# Patient Record
Sex: MALE | Race: WHITE | NOT HISPANIC OR LATINO | Employment: OTHER | URBAN - METROPOLITAN AREA
[De-identification: names, ages, dates, MRNs, and addresses within clinical notes are randomized per-mention and may not be internally consistent; named-entity substitution may affect disease eponyms.]

---

## 2018-10-08 ENCOUNTER — HISTORICAL (OUTPATIENT)
Dept: ADMINISTRATIVE | Facility: HOSPITAL | Age: 63
End: 2018-10-08

## 2022-04-10 ENCOUNTER — HISTORICAL (OUTPATIENT)
Dept: ADMINISTRATIVE | Facility: HOSPITAL | Age: 67
End: 2022-04-10
Payer: MEDICARE

## 2022-04-29 VITALS
BODY MASS INDEX: 28.88 KG/M2 | WEIGHT: 225 LBS | SYSTOLIC BLOOD PRESSURE: 122 MMHG | HEIGHT: 74 IN | DIASTOLIC BLOOD PRESSURE: 72 MMHG

## 2023-02-15 RX ORDER — PANTOPRAZOLE SODIUM 40 MG/1
40 TABLET, DELAYED RELEASE ORAL
COMMUNITY
Start: 2022-01-06

## 2023-02-15 RX ORDER — PANTOPRAZOLE SODIUM 40 MG/1
40 TABLET, DELAYED RELEASE ORAL DAILY
COMMUNITY

## 2023-02-15 RX ORDER — LEVETIRACETAM 750 MG/1
750 TABLET ORAL 2 TIMES DAILY
COMMUNITY

## 2023-02-15 RX ORDER — CANAGLIFLOZIN 100 MG/1
100 TABLET, FILM COATED ORAL DAILY
COMMUNITY

## 2023-02-15 RX ORDER — METFORMIN HYDROCHLORIDE 500 MG/1
500 TABLET ORAL 2 TIMES DAILY WITH MEALS
COMMUNITY

## 2023-02-15 RX ORDER — BUSPIRONE HYDROCHLORIDE 10 MG/1
10 TABLET ORAL
COMMUNITY
Start: 2022-01-06

## 2023-02-15 RX ORDER — CYCLOSPORINE 0.5 MG/ML
1 EMULSION OPHTHALMIC
COMMUNITY
Start: 2022-01-06

## 2023-07-20 ENCOUNTER — OFFICE VISIT (OUTPATIENT)
Dept: NEUROLOGY | Facility: CLINIC | Age: 68
End: 2023-07-20
Payer: MEDICARE

## 2023-07-20 VITALS
WEIGHT: 224 LBS | BODY MASS INDEX: 28.75 KG/M2 | DIASTOLIC BLOOD PRESSURE: 84 MMHG | SYSTOLIC BLOOD PRESSURE: 120 MMHG | HEIGHT: 74 IN

## 2023-07-20 DIAGNOSIS — Q28.2 CEREBRAL AVM: Primary | ICD-10-CM

## 2023-07-20 DIAGNOSIS — G81.94 LEFT HEMIPLEGIA: ICD-10-CM

## 2023-07-20 DIAGNOSIS — Z86.79 HISTORY OF CEREBRAL HEMORRHAGE: ICD-10-CM

## 2023-07-20 PROCEDURE — 99999 PR PBB SHADOW E&M-EST. PATIENT-LVL III: CPT | Mod: PBBFAC,,, | Performed by: SPECIALIST

## 2023-07-20 PROCEDURE — 99213 PR OFFICE/OUTPT VISIT, EST, LEVL III, 20-29 MIN: ICD-10-PCS | Mod: S$PBB,,, | Performed by: SPECIALIST

## 2023-07-20 PROCEDURE — 99213 OFFICE O/P EST LOW 20 MIN: CPT | Mod: PBBFAC | Performed by: SPECIALIST

## 2023-07-20 PROCEDURE — 99999 PR PBB SHADOW E&M-EST. PATIENT-LVL III: ICD-10-PCS | Mod: PBBFAC,,, | Performed by: SPECIALIST

## 2023-07-20 PROCEDURE — 99213 OFFICE O/P EST LOW 20 MIN: CPT | Mod: S$PBB,,, | Performed by: SPECIALIST

## 2023-07-20 RX ORDER — ROSUVASTATIN CALCIUM 20 MG/1
20 TABLET, COATED ORAL
COMMUNITY

## 2023-07-20 RX ORDER — ASPIRIN 81 MG/1
TABLET ORAL
COMMUNITY

## 2023-07-20 RX ORDER — DOXEPIN HYDROCHLORIDE 50 MG/1
50 CAPSULE ORAL NIGHTLY
COMMUNITY
Start: 2023-02-06

## 2023-07-20 RX ORDER — AMOXICILLIN 500 MG
2 CAPSULE ORAL
COMMUNITY

## 2023-07-20 NOTE — PROGRESS NOTES
"Subjective:         Patient ID: Isac Guzman is a 68 y.o. male.    Chief Complaint: F/U AVM.     HPI:           Pt has left sided paralysis. Pt c/o decreased hearing in left ear. Denies H., Sz, diff w Speech or vision problems. Pt ambulates w a 4 prong cane.       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    busPIRone (BUSPAR) 10 mg, Oral    cycloSPORINE (RESTASIS MULTIDOSE) 0.05 % Drop 1 drop    doxepin (SINEQUAN) 50 mg, Oral, Nightly    INVOKANA 100 mg, Oral, Daily    levETIRAcetam (KEPPRA) 750 mg, Oral, 2 times daily    metFORMIN (GLUCOPHAGE) 500 mg, Oral, 2 times daily with meals    omega-3 fatty acids/fish oil (FISH OIL-OMEGA-3 FATTY ACIDS) 300-1,000 mg capsule 2 g, Oral    pantoprazole (PROTONIX) 40 mg, Oral    pantoprazole (PROTONIX) 40 mg, Oral, Daily    polycarbophil (FIBERCON) 625 mg, Oral    rosuvastatin (CRESTOR) 20 mg, Oral        Objective:      Exam  /84   Ht 6' 2" (1.88 m)   Wt 101.6 kg (224 lb)   BMI 28.76 kg/m²     General:   [x] Unaccompanied   [] Accompanied, by__  heart:   pharynx:    Neurological []nl  []Abnml:     Speech:  [x] []  vis fields:  [] []  EOMs:  [] []  funduscopic: [] []  Motor:   [] [x]L hemiparesis   coord:   [] []  Gait:   [] [x]Cane     Neuroimaging:  []Images and imaging reports reviewed.  My comments: last mri ace 2021     Labs:            Assessment/Plan:         ICD-10-CM ICD-9-CM   1. Cerebral AVM  Q28.2 747.81   2. History of cerebral hemorrhage  Z86.79 V12.59   3. " Left hemiplegia  G81.94 342.90         Other comments/ follow up:        Cont pres mgmt     Follow up in about 1 year (around 7/20/2024).         Constantine Marina MD TEOFILO FAAN FAASM

## 2024-04-22 ENCOUNTER — ANESTHESIA EVENT (OUTPATIENT)
Dept: SURGERY | Facility: HOSPITAL | Age: 69
DRG: 671 | End: 2024-04-22
Payer: MEDICARE

## 2024-04-24 RX ORDER — POLYETHYLENE GLYCOL 3350 17 G/17G
17 POWDER, FOR SOLUTION ORAL DAILY
COMMUNITY

## 2024-04-30 ENCOUNTER — HOSPITAL ENCOUNTER (INPATIENT)
Facility: HOSPITAL | Age: 69
LOS: 2 days | Discharge: HOME HEALTH | DRG: 671 | End: 2024-05-02
Attending: UROLOGY | Admitting: UROLOGY
Payer: MEDICARE

## 2024-04-30 ENCOUNTER — ANESTHESIA (OUTPATIENT)
Dept: SURGERY | Facility: HOSPITAL | Age: 69
DRG: 671 | End: 2024-04-30
Payer: MEDICARE

## 2024-04-30 DIAGNOSIS — Z86.79 HISTORY OF CEREBRAL HEMORRHAGE: ICD-10-CM

## 2024-04-30 DIAGNOSIS — Q28.2 CEREBRAL AVM: ICD-10-CM

## 2024-04-30 DIAGNOSIS — N39.3 MALE URINARY STRESS INCONTINENCE: Primary | ICD-10-CM

## 2024-04-30 LAB
POCT GLUCOSE: 109 MG/DL (ref 70–110)
POCT GLUCOSE: 79 MG/DL (ref 70–110)
POCT GLUCOSE: 92 MG/DL (ref 70–110)

## 2024-04-30 PROCEDURE — D9220A PRA ANESTHESIA: Mod: ANES,,, | Performed by: ANESTHESIOLOGY

## 2024-04-30 PROCEDURE — 25000003 PHARM REV CODE 250: Performed by: UROLOGY

## 2024-04-30 PROCEDURE — 36000706: Performed by: UROLOGY

## 2024-04-30 PROCEDURE — 71000039 HC RECOVERY, EACH ADD'L HOUR: Performed by: UROLOGY

## 2024-04-30 PROCEDURE — 37000008 HC ANESTHESIA 1ST 15 MINUTES: Performed by: UROLOGY

## 2024-04-30 PROCEDURE — 63600175 PHARM REV CODE 636 W HCPCS: Performed by: UROLOGY

## 2024-04-30 PROCEDURE — 25000003 PHARM REV CODE 250: Performed by: NURSE ANESTHETIST, CERTIFIED REGISTERED

## 2024-04-30 PROCEDURE — 0TVD8ZZ RESTRICTION OF URETHRA, VIA NATURAL OR ARTIFICIAL OPENING ENDOSCOPIC: ICD-10-PCS | Performed by: UROLOGY

## 2024-04-30 PROCEDURE — 37000009 HC ANESTHESIA EA ADD 15 MINS: Performed by: UROLOGY

## 2024-04-30 PROCEDURE — D9220A PRA ANESTHESIA: Mod: CRNA,,, | Performed by: NURSE ANESTHETIST, CERTIFIED REGISTERED

## 2024-04-30 PROCEDURE — 25000003 PHARM REV CODE 250: Performed by: ANESTHESIOLOGY

## 2024-04-30 PROCEDURE — 71000033 HC RECOVERY, INTIAL HOUR: Performed by: UROLOGY

## 2024-04-30 PROCEDURE — 11000001 HC ACUTE MED/SURG PRIVATE ROOM

## 2024-04-30 PROCEDURE — C1758 CATHETER, URETERAL: HCPCS | Performed by: UROLOGY

## 2024-04-30 PROCEDURE — 71000015 HC POSTOP RECOV 1ST HR: Performed by: UROLOGY

## 2024-04-30 PROCEDURE — 63600175 PHARM REV CODE 636 W HCPCS: Performed by: NURSE ANESTHETIST, CERTIFIED REGISTERED

## 2024-04-30 PROCEDURE — L8606 SYNTHETIC IMPLNT URINARY 1ML: HCPCS | Performed by: UROLOGY

## 2024-04-30 PROCEDURE — 82962 GLUCOSE BLOOD TEST: CPT | Performed by: UROLOGY

## 2024-04-30 PROCEDURE — 36000707: Performed by: UROLOGY

## 2024-04-30 DEVICE — SYS BULKAMID URETH BULKING 2ML: Type: IMPLANTABLE DEVICE | Site: URETHRA | Status: FUNCTIONAL

## 2024-04-30 RX ORDER — BUSPIRONE HYDROCHLORIDE 5 MG/1
10 TABLET ORAL 3 TIMES DAILY
Status: DISCONTINUED | OUTPATIENT
Start: 2024-04-30 | End: 2024-05-03 | Stop reason: HOSPADM

## 2024-04-30 RX ORDER — PHENYLEPHRINE HCL IN 0.9% NACL 1 MG/10 ML
SYRINGE (ML) INTRAVENOUS
Status: DISCONTINUED | OUTPATIENT
Start: 2024-04-30 | End: 2024-04-30

## 2024-04-30 RX ORDER — HYDROCODONE BITARTRATE AND ACETAMINOPHEN 5; 325 MG/1; MG/1
1 TABLET ORAL EVERY 4 HOURS PRN
Status: DISCONTINUED | OUTPATIENT
Start: 2024-04-30 | End: 2024-05-03 | Stop reason: HOSPADM

## 2024-04-30 RX ORDER — SODIUM CHLORIDE, SODIUM LACTATE, POTASSIUM CHLORIDE, CALCIUM CHLORIDE 600; 310; 30; 20 MG/100ML; MG/100ML; MG/100ML; MG/100ML
INJECTION, SOLUTION INTRAVENOUS CONTINUOUS
Status: DISCONTINUED | OUTPATIENT
Start: 2024-04-30 | End: 2024-05-03 | Stop reason: HOSPADM

## 2024-04-30 RX ORDER — DEXAMETHASONE SODIUM PHOSPHATE 4 MG/ML
INJECTION, SOLUTION INTRA-ARTICULAR; INTRALESIONAL; INTRAMUSCULAR; INTRAVENOUS; SOFT TISSUE
Status: DISCONTINUED | OUTPATIENT
Start: 2024-04-30 | End: 2024-04-30

## 2024-04-30 RX ORDER — CIPROFLOXACIN 2 MG/ML
400 INJECTION, SOLUTION INTRAVENOUS
Status: DISCONTINUED | OUTPATIENT
Start: 2024-04-30 | End: 2024-04-30 | Stop reason: HOSPADM

## 2024-04-30 RX ORDER — CIPROFLOXACIN 500 MG/1
500 TABLET ORAL 2 TIMES DAILY
Qty: 10 TABLET | Refills: 0 | Status: SHIPPED | OUTPATIENT
Start: 2024-04-30 | End: 2024-05-05

## 2024-04-30 RX ORDER — KETOROLAC TROMETHAMINE 30 MG/ML
INJECTION, SOLUTION INTRAMUSCULAR; INTRAVENOUS
Status: DISCONTINUED | OUTPATIENT
Start: 2024-04-30 | End: 2024-04-30

## 2024-04-30 RX ORDER — GLYCOPYRROLATE 0.2 MG/ML
INJECTION INTRAMUSCULAR; INTRAVENOUS
Status: DISCONTINUED | OUTPATIENT
Start: 2024-04-30 | End: 2024-04-30

## 2024-04-30 RX ORDER — LIDOCAINE HYDROCHLORIDE 10 MG/ML
INJECTION, SOLUTION EPIDURAL; INFILTRATION; INTRACAUDAL; PERINEURAL
Status: DISCONTINUED | OUTPATIENT
Start: 2024-04-30 | End: 2024-04-30

## 2024-04-30 RX ORDER — LEVETIRACETAM 750 MG/1
750 TABLET ORAL 2 TIMES DAILY
Status: DISCONTINUED | OUTPATIENT
Start: 2024-04-30 | End: 2024-04-30

## 2024-04-30 RX ORDER — SODIUM CHLORIDE 0.9 % (FLUSH) 0.9 %
10 SYRINGE (ML) INJECTION
Status: DISCONTINUED | OUTPATIENT
Start: 2024-04-30 | End: 2024-04-30 | Stop reason: HOSPADM

## 2024-04-30 RX ORDER — PROPOFOL 10 MG/ML
INJECTION, EMULSION INTRAVENOUS
Status: DISCONTINUED | OUTPATIENT
Start: 2024-04-30 | End: 2024-04-30

## 2024-04-30 RX ORDER — LEVETIRACETAM 100 MG/ML
750 SOLUTION ORAL 2 TIMES DAILY
Status: DISCONTINUED | OUTPATIENT
Start: 2024-04-30 | End: 2024-05-03 | Stop reason: HOSPADM

## 2024-04-30 RX ORDER — LIDOCAINE HYDROCHLORIDE 10 MG/ML
1 INJECTION, SOLUTION EPIDURAL; INFILTRATION; INTRACAUDAL; PERINEURAL ONCE
Status: CANCELLED | OUTPATIENT
Start: 2024-04-30 | End: 2024-04-30

## 2024-04-30 RX ORDER — FAMOTIDINE 10 MG/ML
20 INJECTION INTRAVENOUS ONCE
Status: COMPLETED | OUTPATIENT
Start: 2024-04-30 | End: 2024-04-30

## 2024-04-30 RX ORDER — ONDANSETRON HYDROCHLORIDE 2 MG/ML
INJECTION, SOLUTION INTRAVENOUS
Status: DISCONTINUED | OUTPATIENT
Start: 2024-04-30 | End: 2024-04-30

## 2024-04-30 RX ORDER — FENTANYL CITRATE 50 UG/ML
INJECTION, SOLUTION INTRAMUSCULAR; INTRAVENOUS
Status: DISCONTINUED | OUTPATIENT
Start: 2024-04-30 | End: 2024-04-30

## 2024-04-30 RX ORDER — HYDROCODONE BITARTRATE AND ACETAMINOPHEN 5; 325 MG/1; MG/1
1 TABLET ORAL
Status: DISCONTINUED | OUTPATIENT
Start: 2024-04-30 | End: 2024-04-30 | Stop reason: HOSPADM

## 2024-04-30 RX ORDER — MIDAZOLAM HYDROCHLORIDE 1 MG/ML
INJECTION INTRAMUSCULAR; INTRAVENOUS
Status: DISCONTINUED | OUTPATIENT
Start: 2024-04-30 | End: 2024-04-30

## 2024-04-30 RX ORDER — HYDROMORPHONE HYDROCHLORIDE 2 MG/ML
0.2 INJECTION, SOLUTION INTRAMUSCULAR; INTRAVENOUS; SUBCUTANEOUS EVERY 5 MIN PRN
Status: DISCONTINUED | OUTPATIENT
Start: 2024-04-30 | End: 2024-04-30 | Stop reason: HOSPADM

## 2024-04-30 RX ORDER — DOXEPIN HYDROCHLORIDE 25 MG/1
50 CAPSULE ORAL NIGHTLY
Status: DISCONTINUED | OUTPATIENT
Start: 2024-04-30 | End: 2024-05-03 | Stop reason: HOSPADM

## 2024-04-30 RX ORDER — ONDANSETRON 4 MG/1
8 TABLET, ORALLY DISINTEGRATING ORAL EVERY 8 HOURS PRN
Status: DISCONTINUED | OUTPATIENT
Start: 2024-04-30 | End: 2024-05-03 | Stop reason: HOSPADM

## 2024-04-30 RX ORDER — FENTANYL CITRATE 50 UG/ML
25 INJECTION, SOLUTION INTRAMUSCULAR; INTRAVENOUS EVERY 5 MIN PRN
Status: DISCONTINUED | OUTPATIENT
Start: 2024-04-30 | End: 2024-04-30 | Stop reason: HOSPADM

## 2024-04-30 RX ADMIN — GLYCOPYRROLATE 0.2 MG: 0.2 INJECTION INTRAMUSCULAR; INTRAVENOUS at 05:04

## 2024-04-30 RX ADMIN — CIPROFLOXACIN 400 MG: 2 INJECTION, SOLUTION INTRAVENOUS at 05:04

## 2024-04-30 RX ADMIN — PROPOFOL 150 MG: 10 INJECTION, EMULSION INTRAVENOUS at 05:04

## 2024-04-30 RX ADMIN — ONDANSETRON 4 MG: 2 INJECTION INTRAMUSCULAR; INTRAVENOUS at 06:04

## 2024-04-30 RX ADMIN — DOXEPIN HYDROCHLORIDE 50 MG: 25 CAPSULE ORAL at 09:04

## 2024-04-30 RX ADMIN — KETOROLAC TROMETHAMINE 15 MG: 30 INJECTION, SOLUTION INTRAMUSCULAR; INTRAVENOUS at 06:04

## 2024-04-30 RX ADMIN — LIDOCAINE HYDROCHLORIDE 4 ML: 10 INJECTION, SOLUTION EPIDURAL; INFILTRATION; INTRACAUDAL; PERINEURAL at 05:04

## 2024-04-30 RX ADMIN — LEVETIRACETAM 750 MG: 100 SOLUTION ORAL at 09:04

## 2024-04-30 RX ADMIN — Medication 100 MCG: at 05:04

## 2024-04-30 RX ADMIN — FAMOTIDINE 20 MG: 10 INJECTION, SOLUTION INTRAVENOUS at 04:04

## 2024-04-30 RX ADMIN — MIDAZOLAM HYDROCHLORIDE 2 MG: 1 INJECTION, SOLUTION INTRAMUSCULAR; INTRAVENOUS at 05:04

## 2024-04-30 RX ADMIN — FENTANYL CITRATE 100 MCG: 50 INJECTION, SOLUTION INTRAMUSCULAR; INTRAVENOUS at 05:04

## 2024-04-30 RX ADMIN — BUSPIRONE HYDROCHLORIDE 10 MG: 5 TABLET ORAL at 09:04

## 2024-04-30 RX ADMIN — SODIUM CHLORIDE, POTASSIUM CHLORIDE, SODIUM LACTATE AND CALCIUM CHLORIDE: 600; 310; 30; 20 INJECTION, SOLUTION INTRAVENOUS at 11:04

## 2024-04-30 RX ADMIN — DEXAMETHASONE SODIUM PHOSPHATE 4 MG: 4 INJECTION, SOLUTION INTRA-ARTICULAR; INTRALESIONAL; INTRAMUSCULAR; INTRAVENOUS; SOFT TISSUE at 05:04

## 2024-04-30 NOTE — ANESTHESIA PROCEDURE NOTES
Intubation    Date/Time: 4/30/2024 5:32 PM    Performed by: Stephen Ledesma CRNA  Authorized by: Thanh Keane DO    Intubation:     Induction:  Intravenous    Mask Ventilation:  Not attempted    Attempts:  1    Attempted By:  CRNA    Difficult Airway Encountered?: No      Complications:  None    Airway Device:  Supraglottic airway/LMA    Airway Device Size:  4.0    Style/Cuff Inflation:  Cuffed (inflated to minimal occlusive pressure)    Inflation Amount (mL):  20    Placement Verified By:  Capnometry    Complicating Factors:  None    Findings Post-Intubation:  BS equal bilateral and atraumatic/condition of teeth unchanged

## 2024-04-30 NOTE — DISCHARGE SUMMARY
[facility] - Periop Services  Discharge Note  Short Stay    Procedure(s) (LRB):  CYSTOSCOPY, WITH PERIURETHRAL BULKING AGENT INJECTION (N/A)      OUTCOME: Patient tolerated treatment/procedure well without complication and is now ready for discharge.    DISPOSITION: Home or Self Care    FINAL DIAGNOSIS:  Male urinary stress incontinence    FOLLOWUP: In clinic    DISCHARGE INSTRUCTIONS:    Discharge Procedure Orders   Diet general     Call MD for:  temperature >100.4     Call MD for:  persistent nausea and vomiting     Call MD for:  severe uncontrolled pain     No dressing needed     Activity as tolerated        TIME SPENT ON DISCHARGE: 15 minutes

## 2024-04-30 NOTE — OP NOTE
operative note    Surgeon:  Seferino Gardner MD     Date of procedure:  04/30/2024     Preop diagnosis:  Post prostatectomy stress urinary incontinence    Postop diagnosis: Same     Procedure:  Suburethral bulking agent, Bulkamid    Preoperative history and indication for procedure:  This patient is a 69-year-old white male with history of prostate cancer and he underwent robot assisted laparoscopic prostatectomy couple of years ago.  He was having some stress incontinence.  He has had a stroke and has left hemiparesis and probably some component of neurogenic bladder as well.  I used some macro plastic previously but he still had persisting incontinence.  He comes in today for a trial with different bulking agent.      Procedure in detail:  After obtaining proper consent the patient was taken to the procedure room with a cystoscopy equipment was available.  We open the urethra tome system to use for the endoscopic portion.  Cystoscopy was carried out revealing intact sphincter with apparent prior injection sites.  The sphincter seemed fairly open.  The bulk of mid was injected in several locations circumferentially around the sphincteric area submucosally with good ballooning and coaptation.  The areas where macro plasty CT been were not very receptive to Aneviaa Unype.  I ended up using 4 syringes and was ultimately very pleased with the amount of coaptation that we got.  After completion of the injection I inserted a 5 Icelandic open-ended catheter through the aperture and aspirated his bladder.  On examination at the end the prostate was palpable as it is surgically absent.  That completed the procedure.  The patient was awakened and extubated and transported to recovery room with stable vital signs and in stable condition.  He requested observation overnight given his debility and late time of completion of this case and he also requested home health for some other issues.  We will try to get that arranged.  I  will place him on observation overnight.      Seferino Gardner MD

## 2024-04-30 NOTE — TRANSFER OF CARE
Anesthesia Transfer of Care Note    Patient: Isac Guzman    Procedure(s) Performed: Procedure(s) (LRB):  CYSTOSCOPY, WITH PERIURETHRAL BULKING AGENT INJECTION (N/A)    Patient location: PACU    Anesthesia Type: general    Transport from OR: Transported from OR on room air with adequate spontaneous ventilation    Post pain: adequate analgesia    Post assessment: tolerated procedure well and no apparent anesthetic complications    Post vital signs: stable    Level of consciousness: sedated    Nausea/Vomiting: no nausea/vomiting    Complications: none    Transfer of care protocol was followed      Last vitals: Visit Vitals  /75   Pulse (!) 53   Temp 36.9 °C (98.4 °F) (Oral)   Resp 18   Ht 6' (1.829 m)   Wt 100.2 kg (220 lb 14.4 oz)   SpO2 98%   BMI 29.96 kg/m²

## 2024-04-30 NOTE — H&P
The patient has been examined and the H&P has been reviewed:  I concur with the findings and no changes have occurred since H&P was written.      Anesthesia/Surgery risks, benefits and alternative options discussed and understood by patient/family.

## 2024-04-30 NOTE — ANESTHESIA PREPROCEDURE EVALUATION
04/30/2024  Isac Guzman is a 69 y.o., male.      Pre-op Assessment    I have reviewed the Patient Summary Reports.     I have reviewed the Nursing Notes. I have reviewed the NPO Status.   I have reviewed the Medications.     Review of Systems  Anesthesia Hx:  No problems with previous Anesthesia                Social:  Former Smoker Quit smoking 2007      Cardiovascular:      Denies Hypertension.   Denies MI.        Denies Angina.  Denies CHF.        Carotid stenosis                         Pulmonary:    Denies COPD.  Denies Asthma.                    Renal/:   Denies Chronic Renal Disease. no renal calculi               Hepatic/GI:     GERD, well controlled Denies Liver Disease.  Denies Hepatitis.    Gerd          Neurological:   CVA (left hemiplegia), residual symptoms    Denies Seizures.    Hx AVM - large, not surgical  Hx cerebral hemorrhage                 CVA - Cerebrovasular Accident                 Endocrine:  Diabetes (diet controlled), well controlled Denies Hypothyroidism.  Denies Hyperthyroidism.  Diabetes                    Denies Obesity / BMI > 30  Psych:  Psychiatric History anxiety                 Physical Exam  General: Well nourished, Cooperative, Alert and Oriented    Airway:  Mallampati: I   Mouth Opening: Normal  TM Distance: Normal  Tongue: Normal  Neck ROM: Normal ROM    Dental:        Anesthesia Plan  Type of Anesthesia, risks & benefits discussed:    Anesthesia Type: Gen Supraglottic Airway  Intra-op Monitoring Plan: Standard ASA Monitors  Induction:  IV  Informed Consent: Informed consent signed with the Patient and all parties understand the risks and agree with anesthesia plan.  All questions answered.   ASA Score: 3  Day of Surgery Review of History & Physical: H&P Update referred to the surgeon/provider.    Ready For Surgery From Anesthesia Perspective.     .

## 2024-05-01 PROCEDURE — 63600175 PHARM REV CODE 636 W HCPCS: Performed by: UROLOGY

## 2024-05-01 PROCEDURE — 11000001 HC ACUTE MED/SURG PRIVATE ROOM

## 2024-05-01 PROCEDURE — 25000003 PHARM REV CODE 250: Performed by: UROLOGY

## 2024-05-01 PROCEDURE — 0T9B70Z DRAINAGE OF BLADDER WITH DRAINAGE DEVICE, VIA NATURAL OR ARTIFICIAL OPENING: ICD-10-PCS | Performed by: UROLOGY

## 2024-05-01 PROCEDURE — 51798 US URINE CAPACITY MEASURE: CPT

## 2024-05-01 PROCEDURE — 51702 INSERT TEMP BLADDER CATH: CPT

## 2024-05-01 RX ORDER — TAMSULOSIN HYDROCHLORIDE 0.4 MG/1
0.4 CAPSULE ORAL DAILY
Status: DISCONTINUED | OUTPATIENT
Start: 2024-05-01 | End: 2024-05-03 | Stop reason: HOSPADM

## 2024-05-01 RX ADMIN — SODIUM CHLORIDE, POTASSIUM CHLORIDE, SODIUM LACTATE AND CALCIUM CHLORIDE: 600; 310; 30; 20 INJECTION, SOLUTION INTRAVENOUS at 03:05

## 2024-05-01 RX ADMIN — TAMSULOSIN HYDROCHLORIDE 0.4 MG: 0.4 CAPSULE ORAL at 05:05

## 2024-05-01 RX ADMIN — BUSPIRONE HYDROCHLORIDE 10 MG: 5 TABLET ORAL at 08:05

## 2024-05-01 RX ADMIN — LEVETIRACETAM 750 MG: 100 SOLUTION ORAL at 08:05

## 2024-05-01 RX ADMIN — BUSPIRONE HYDROCHLORIDE 10 MG: 5 TABLET ORAL at 02:05

## 2024-05-01 RX ADMIN — DOXEPIN HYDROCHLORIDE 50 MG: 25 CAPSULE ORAL at 08:05

## 2024-05-01 NOTE — PROGRESS NOTES
progress note     Patient postop day 1 suburethral bulking agent    Patient reportedly retentive and had 450 cc in his bladder.  He has undergone 2 in and out catheterizations.  He is currently comfortable.    Attempted to have 14 Cambodian catheter placed to allow for discharge and patient became upset and is very anxious given his debility and deficit about going home with a Pinedo catheter.    Vital signs are stable and he is afebrile     Physical exam:  Patient awake alert left hemiparesis in no acute distress     Laboratory:  None     Radiologic studies:  Bladder scan 450 cc most recently.    Impression:  Apparent good result with bulking agent bordering on retentive.  Patient catheterize despite orders to call me for catheterization.  Nurse reports she waited for 2 hours and no one called her back.    Plan:  We will leave the catheter out of patient as he seems to be voiding and leaking some.  We will monitor him overnight and consider placement of a catheter between now and in the morning if necessary.  Hopefully he will void satisfactorily during the night and we can discharge him in the morning.  BMP in the morning.    Seferino Gardner MD

## 2024-05-01 NOTE — NURSING
Nurses Note -- 4 Eyes      5/1/2024   12:04 AM      Skin assessed during: Admit      [x] No Altered Skin Integrity Present    [x]Prevention Measures Documented      [] Yes- Altered Skin Integrity Present or Discovered   [] LDA Added if Not in Epic (Describe Wound)   [] New Altered Skin Integrity was Present on Admit and Documented in LDA   [] Wound Image Taken    Wound Care Consulted? No    Attending Nurse:  Penny Joseph RN/Staff Member:  Kathy Gong RN

## 2024-05-01 NOTE — PLAN OF CARE
Patient lives alone in a single story home with ramp. Patient reports multiple cousins available to assist as needed. DME in use at home: cane. Patient reports recently had [facility]  but was discharged. Requesting referral be sent to [facility]  to start care, order and referral sent via [facility]. PCP: Dr. Casper, Pharmacy: [facility]       05/01/24 7569   Discharge Assessment   Assessment Type Discharge Planning Assessment   Confirmed/corrected address, phone number and insurance Yes   Confirmed Demographics Correct on Facesheet   Source of Information patient   People in Home alone   Do you expect to return to your current living situation? Yes   Do you have help at home or someone to help you manage your care at home? Yes   Prior to hospitilization cognitive status: Alert/Oriented   Current cognitive status: Alert/Oriented   Home Accessibility wheelchair accessible   Home Layout Able to live on 1st floor   Equipment Currently Used at Home cane, straight   Readmission within 30 days? No   Patient currently being followed by outpatient case management? No   Do you currently have service(s) that help you manage your care at home? No   Do you take prescription medications? Yes   Do you have prescription coverage? Yes   Coverage Medicare/Medicaid   Do you have any problems affording any of your prescribed medications? No   Is the patient taking medications as prescribed? yes   How do you get to doctors appointments? family or friend will provide;health plan transportation   Are you on dialysis? No   Do you take coumadin? No   Discharge Plan A Home Health   Discharge Plan B Home   DME Needed Upon Discharge  other (see comments)  (TBD)   Discharge Plan discussed with: Patient   Transition of Care Barriers None

## 2024-05-01 NOTE — PLAN OF CARE
05/01/24 1605   Final Note   Assessment Type Final Discharge Note   Anticipated Discharge Disposition Home-Health   Hospital Resources/Appts/Education Provided Post-Acute resouces added to AVS   Post-Acute Status   Post-Acute Authorization Home Health   Home Health Status Set-up Complete/Auth obtained  (TETE Aranda )

## 2024-05-01 NOTE — ANESTHESIA POSTPROCEDURE EVALUATION
Anesthesia Post Evaluation    Patient: Isac Guzman    Procedure(s) Performed: Procedure(s) (LRB):  CYSTOSCOPY, WITH PERIURETHRAL BULKING AGENT INJECTION (N/A)    Final Anesthesia Type: general      Patient location during evaluation: PACU  Patient participation: Yes- Able to Participate  Level of consciousness: awake and alert  Post-procedure vital signs: reviewed and stable  Pain management: adequate  Airway patency: patent    PONV status at discharge: No PONV  Anesthetic complications: no      Cardiovascular status: blood pressure returned to baseline  Respiratory status: unassisted and spontaneous ventilation  Hydration status: euvolemic  Follow-up needed               Vitals Value Taken Time   /55 04/30/24 1931   Temp 36.3 °C (97.3 °F) 04/30/24 1840   Pulse 57 04/30/24 1937   Resp 21 04/30/24 1937   SpO2 100 % 04/30/24 1937   Vitals shown include unfiled device data.      No case tracking events are documented in the log.      Pain/Jared Score: Jared Score: 10 (4/30/2024  6:50 PM)

## 2024-05-02 VITALS
TEMPERATURE: 99 F | DIASTOLIC BLOOD PRESSURE: 61 MMHG | HEIGHT: 72 IN | HEART RATE: 64 BPM | BODY MASS INDEX: 29.92 KG/M2 | SYSTOLIC BLOOD PRESSURE: 102 MMHG | WEIGHT: 220.88 LBS | RESPIRATION RATE: 18 BRPM | OXYGEN SATURATION: 94 %

## 2024-05-02 LAB
ANION GAP SERPL CALC-SCNC: 15 MEQ/L
BUN SERPL-MCNC: 14.3 MG/DL (ref 8.4–25.7)
CALCIUM SERPL-MCNC: 8.6 MG/DL (ref 8.8–10)
CHLORIDE SERPL-SCNC: 111 MMOL/L (ref 98–107)
CO2 SERPL-SCNC: 12 MMOL/L (ref 23–31)
CREAT SERPL-MCNC: 0.87 MG/DL (ref 0.73–1.18)
CREAT/UREA NIT SERPL: 16
GFR SERPLBLD CREATININE-BSD FMLA CKD-EPI: >60 MLS/MIN/1.73/M2
GLUCOSE SERPL-MCNC: 105 MG/DL (ref 82–115)
POTASSIUM SERPL-SCNC: 4.4 MMOL/L (ref 3.5–5.1)
SODIUM SERPL-SCNC: 138 MMOL/L (ref 136–145)

## 2024-05-02 PROCEDURE — 11000001 HC ACUTE MED/SURG PRIVATE ROOM

## 2024-05-02 PROCEDURE — 25000003 PHARM REV CODE 250: Performed by: UROLOGY

## 2024-05-02 PROCEDURE — 80048 BASIC METABOLIC PNL TOTAL CA: CPT | Performed by: NURSE PRACTITIONER

## 2024-05-02 PROCEDURE — 36415 COLL VENOUS BLD VENIPUNCTURE: CPT | Performed by: NURSE PRACTITIONER

## 2024-05-02 RX ADMIN — BUSPIRONE HYDROCHLORIDE 10 MG: 5 TABLET ORAL at 08:05

## 2024-05-02 RX ADMIN — TAMSULOSIN HYDROCHLORIDE 0.4 MG: 0.4 CAPSULE ORAL at 08:05

## 2024-05-02 RX ADMIN — LEVETIRACETAM 750 MG: 100 SOLUTION ORAL at 08:05

## 2024-05-02 NOTE — NURSING
Pt IV removed, pt educated on graham cath care and switching of bags. Pt also educated on CAUTI prevention. PT educated on new medication and continuing home medications. All questions answered no concerns noted. PT going home with HH via personal vehicle.

## 2024-05-02 NOTE — PLAN OF CARE
05/02/24 0957   Final Note   Assessment Type Final Discharge Note   Anticipated Discharge Disposition Home-Health   Hospital Resources/Appts/Education Provided Post-Acute resouces added to AVS   Post-Acute Status   Post-Acute Authorization Home Health   Home Health Status Set-up Complete/Auth obtained  (Rosi NG)   Discharge Delays None known at this time

## 2024-05-02 NOTE — PROGRESS NOTES
.UROLOGY  PROGRESS  NOTE    Isac Guzman 1955  [id number]  5/2/2024    POD 2    Had Pinedo catheter replaced last night   Vital signs stable, afebrile  No labs today      Exam:    NAD  Card RRR  Resp unlabored  Abd soft, NTND   clear yellow urine and  bag      Recent Results (from the past 24 hour(s))   Basic Metabolic Panel    Collection Time: 05/02/24  4:32 AM   Result Value Ref Range    Sodium Level 138 136 - 145 mmol/L    Potassium Level 4.4 3.5 - 5.1 mmol/L    Chloride 111 (H) 98 - 107 mmol/L    Carbon Dioxide 12 (L) 23 - 31 mmol/L    Glucose Level 105 82 - 115 mg/dL    Blood Urea Nitrogen 14.3 8.4 - 25.7 mg/dL    Creatinine 0.87 0.73 - 1.18 mg/dL    BUN/Creatinine Ratio 16     Calcium Level Total 8.6 (L) 8.8 - 10.0 mg/dL    Anion Gap 15.0 mEq/L    eGFR >60 mls/min/1.73/m2         Assessment:  Post prostatectomy stress urinary incontinence status post suburethral Bulkamid injection  -now with some urinary retention, Pinedo replaced overnight      Plan:  Discharge home with Pinedo and follow up Monday for a voiding trial    TAMEKA Hernandez

## 2024-05-02 NOTE — DISCHARGE SUMMARY
[facility] - 9th Floor Med Surg  Urology  Discharge Summary      Patient Name: Isac Guzman  MRN: [id number]  Admission Date: 4/30/2024  Hospital Length of Stay: 2 days  Discharge Date and Time:  05/02/2024 9:08 AM  Attending Physician: Nelia Cohn MD   Discharging Provider: TAMEKA Hernandez  Primary Care Physician: Frankie Casper MD    Procedure(s) (LRB):  CYSTOSCOPY, WITH PERIURETHRAL BULKING AGENT INJECTION (N/A)     Indwelling Lines/Drains at time of discharge:   Lines/Drains/Airways       Drain  Duration                  Urethral Catheter 05/01/24 2304 Silicone 14 Fr. <1 day                    Hospital Course (synopsis of major diagnoses, care, treatment, and services provided during the course of the hospital stay): Mr. Guzman is a 69-year-old male admitted for observation following elective bulky mid injection for post prostatectomy urinary incontinence.  He did have some issues yesterday and overnight with urinary retention.  Fourteen Czech catheter was inserted once his bladder scan was over 500 mL.  He is clear yellow urine today and denies any complaints.  Tolerating diet without nausea.  He is remained hemodynamically stable.  He has home health set up and we will arrange a voiding trial early next week with them.    Consults:   Consults (From admission, onward)          Status Ordering Provider     Inpatient consult to Social Work/Case Management  Once        Provider:  (Not yet assigned)    Acknowledged NELIA COHN            Significant Diagnostic Studies: None    Pending Diagnostic Studies:       None            Final Active Diagnoses:    Diagnosis Date Noted POA    PRINCIPAL PROBLEM:  Male urinary stress incontinence [N39.3] 04/30/2024 Yes      Problems Resolved During this Admission:       Discharged Condition: good    Disposition: Home or Self Care    Follow Up:   Contact information for follow-up providers       Nelia Cohn MD Follow up in 4  day(s).    Specialty: Urology  Contact information:  [address]  [address]  [facility] [address]  [phone]                       Contact information for after-discharge care       Home Medical Care       [facility] .    Service: Home Health Services  Contact information:  [address]  [facility] [address]  [phone]                                 Patient Instructions:      Diet general     Call MD for:  temperature >100.4     Call MD for:  persistent nausea and vomiting     Call MD for:  severe uncontrolled pain     No dressing needed     SUBSEQUENT HOME HEALTH ORDERS   Order Comments: Arrange Home Health services; eval and treat for all disciplines   PCP: Dr. Casper     Order Specific Question Answer Comments   What Home Health Agency is the patient currently using? Other/External      Notify your health care provider if you experience any of the following:  temperature >100.4     Notify your health care provider if you experience any of the following:  persistent nausea and vomiting or diarrhea     Notify your health care provider if you experience any of the following:  severe uncontrolled pain     Activity as tolerated     Activity as tolerated     Medications:  Reconciled Home Medications:      Medication List        START taking these medications      ciprofloxacin HCl 500 MG tablet  Commonly known as: CIPRO  Take 1 tablet (500 mg total) by mouth 2 (two) times daily. for 5 days            CONTINUE taking these medications      aspirin 81 MG EC tablet  Commonly known as: ECOTRIN  Take 81 mg by mouth once daily.     busPIRone 10 MG tablet  Commonly known as: BUSPAR  Take 10 mg by mouth 3 (three) times daily.     doxepin 50 MG capsule  Commonly known as: SINEQUAN  Take 50 mg by mouth every evening.     fish oil-omega-3 fatty acids 300-1,000 mg capsule  Take 2 capsules by mouth once daily.     GEMTESA ORAL  Take 1 tablet by mouth once daily. Not sure of  dose     INVOKANA 100 mg Tab tablet  Generic drug: canagliflozin  Take 100 mg by mouth once daily.     levETIRAcetam 750 MG Tab  Commonly known as: KEPPRA  Take 750 mg by mouth 2 (two) times daily.     metFORMIN 500 MG tablet  Commonly known as: GLUCOPHAGE  Take 500 mg by mouth 2 (two) times daily with meals.     * pantoprazole 40 MG tablet  Commonly known as: PROTONIX  Take 40 mg by mouth once daily.     * pantoprazole 40 MG tablet  Commonly known as: PROTONIX  Take 40 mg by mouth once daily.     polycarbophil 625 mg tablet  Commonly known as: FIBERCON  Take 625 mg by mouth 2 (two) times a day.     polyethylene glycol 17 gram/dose powder  Commonly known as: GLYCOLAX  Take 17 g by mouth once daily.     RESTASIS MULTIDOSE 0.05 % Drop  Generic drug: cycloSPORINE  Place 1 drop into both eyes Daily.     rosuvastatin 20 MG tablet  Commonly known as: CRESTOR  Take 20 mg by mouth once daily.           * This list has 2 medication(s) that are the same as other medications prescribed for you. Read the directions carefully, and ask your doctor or other care provider to review them with you.                    Mali Fonseca, TAMEKA  Urology  [facility] - 9th Floor Med Surg

## 2024-05-02 NOTE — DISCHARGE INSTRUCTIONS
Clean catheter daily with green wipes  Make sure catheter is in securement device on the leg at all times

## 2024-07-09 ENCOUNTER — TELEPHONE (OUTPATIENT)
Dept: NEUROLOGY | Facility: CLINIC | Age: 69
End: 2024-07-09
Payer: MEDICARE

## 2025-01-30 ENCOUNTER — TELEPHONE (OUTPATIENT)
Dept: NEUROLOGY | Facility: CLINIC | Age: 70
End: 2025-01-30

## 2025-01-30 ENCOUNTER — OFFICE VISIT (OUTPATIENT)
Dept: NEUROLOGY | Facility: CLINIC | Age: 70
End: 2025-01-30
Payer: MEDICARE

## 2025-01-30 VITALS
DIASTOLIC BLOOD PRESSURE: 78 MMHG | HEIGHT: 73 IN | SYSTOLIC BLOOD PRESSURE: 126 MMHG | WEIGHT: 232 LBS | BODY MASS INDEX: 30.75 KG/M2

## 2025-01-30 DIAGNOSIS — R29.6 MULTIPLE FALLS: ICD-10-CM

## 2025-01-30 DIAGNOSIS — G40.A09 ABSENCE SEIZURE: ICD-10-CM

## 2025-01-30 DIAGNOSIS — Z86.79 HISTORY OF CEREBRAL HEMORRHAGE: ICD-10-CM

## 2025-01-30 DIAGNOSIS — G81.94 LEFT HEMIPLEGIA: ICD-10-CM

## 2025-01-30 DIAGNOSIS — Q28.2 CEREBRAL AVM: Primary | ICD-10-CM

## 2025-01-30 PROCEDURE — 99214 OFFICE O/P EST MOD 30 MIN: CPT | Mod: S$PBB,,, | Performed by: SPECIALIST

## 2025-01-30 PROCEDURE — 99999 PR PBB SHADOW E&M-EST. PATIENT-LVL III: CPT | Mod: PBBFAC,,, | Performed by: SPECIALIST

## 2025-01-30 PROCEDURE — 99213 OFFICE O/P EST LOW 20 MIN: CPT | Mod: PBBFAC | Performed by: SPECIALIST

## 2025-01-30 RX ORDER — LEVETIRACETAM 750 MG/1
750 TABLET ORAL 2 TIMES DAILY
Qty: 180 TABLET | Refills: 3 | Status: SHIPPED | OUTPATIENT
Start: 2025-01-30

## 2025-01-30 RX ORDER — POLYETHYLENE GLYCOL 3350 17 G/17G
17 POWDER, FOR SOLUTION ORAL DAILY
COMMUNITY
Start: 2023-11-13

## 2025-01-30 NOTE — TELEPHONE ENCOUNTER
Spoke with Nurse at Dr. Soni' office  and requested a referral be placed for Mr. Guzman. She stated that they would schedule an office visit  with him and do the referral at that time.

## 2025-01-30 NOTE — PROGRESS NOTES
"Subjective:         Patient ID: Isac Guzman is a 69 y.o. male.    Chief Complaint/HPI:   Chief Complaint   Patient presents with    Cerebral AVM     Patient presents for f/u of AVM  pt. 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         Addn HPI:            ...  notes may also be on facesheet for HPI, ROS, and other sections   ROS:           _;_Lives alone    _;_Does not drive     Retired:     Current Outpatient Medications   Medication Instructions    aspirin (ECOTRIN) 81 mg, Daily    busPIRone (BUSPAR) 10 mg, 3 times daily    cycloSPORINE (RESTASIS MULTIDOSE) 0.05 % Drop 1 drop, Daily    doxepin (SINEQUAN) 50 mg, Nightly    INVOKANA 100 mg, Daily    levETIRAcetam (KEPPRA) 750 mg, 2 times daily    metFORMIN (GLUCOPHAGE) 500 mg, 2 times daily with meals    omega-3 fatty acids/fish oil (FISH OIL-OMEGA-3 FATTY ACIDS) 300-1,000 mg capsule 2 capsules, Daily    pantoprazole (PROTONIX) 40 mg, Daily    pantoprazole (PROTONIX) 40 mg, Oral, Daily    polycarbophil (FIBERCON) 625 mg, 2 times daily    polyethylene glycol (GLYCOLAX) 17 g, Daily    polyethylene glycol (GLYCOLAX) 17 g, Daily    rosuvastatin (CRESTOR) 20 mg, Daily    vibegron (GEMTESA ORAL) 1 tablet, Daily      Objective:      Exam  /78 (BP Location: Left arm, Patient Position: Sitting)   Ht 6' 1" (1.854 m)   Wt 105.2 kg (232 lb)   BMI 30.61 kg/m²   General:   If Accompanied, by__  n  heart:   pharynx:  Neurological   Speech: Flat    vis fields:  L hemianopia   EOMs:    funduscopic:   Motor:  L hemiplegia   coord:     Gait: has 4 pt cane       Neuroimaging:  []Images and imaging reports reviewed. My comments:     Labs:    Complexity of Data:     [] High  [] Moderate   Risks:   [] High   [] " Moderate   MDM:      [] High   [x] Moderate         Assessment/Plan:         ICD-10-CM ICD-9-CM   1. Cerebral AVM  Q28.2 747.81   2. History of cerebral hemorrhage  Z86.79 V12.59   3. Left hemiplegia  G81.94 342.90   4. Absence seizure  G40.A09 345.00   5. Multiple falls  R29.6 V15.88     Other comments/ follow up:        Hopeful reassurance   Regretfully I have nothing helpful about the bladder issues   Continue pres mgmt with Keppra     He will get with Pennie's office about referral to surgeon for hernia related issues     Medications Ordered This Encounter   Medications    levETIRAcetam (KEPPRA) 750 MG Tab     Sig: Take 1 tablet (750 mg total) by mouth 2 (two) times daily.     Dispense:  180 tablet     Refill:  3     Orders Placed This Encounter   Procedures    Ambulatory Referral/Consult to Physical Therapy/Occupational Therapy    [facility]     Aim to revisit one year     Constantine Marina MD TEOFILO FAAN FAASM

## 2025-02-21 ENCOUNTER — TELEPHONE (OUTPATIENT)
Dept: SURGERY | Facility: CLINIC | Age: 70
End: 2025-02-21
Payer: MEDICARE

## 2025-03-21 ENCOUNTER — TELEPHONE (OUTPATIENT)
Dept: NEUROLOGY | Facility: CLINIC | Age: 70
End: 2025-03-21

## (undated) DEVICE — POSITIONER HEAD ADULT

## (undated) DEVICE — CATH POLLACK OPEN-END FLEXI-TI

## (undated) DEVICE — NDL MACROPLASTIQUE INJ 3.8FR

## (undated) DEVICE — BAG DRAIN UROLOGY AND HOSE

## (undated) DEVICE — TRAY SKIN SCRUB WET PREMIUM

## (undated) DEVICE — KIT SURGICAL TURNOVER

## (undated) DEVICE — Device

## (undated) DEVICE — SOL IRRIGATION WATER 3000ML

## (undated) DEVICE — SUPPORT ULNA NERVE PROTECTOR

## (undated) DEVICE — SOL IRRI STRL WATER 1000ML

## (undated) DEVICE — ELECTRODE PATIENT RETURN DISP

## (undated) DEVICE — ADAPTER STOPCOCK FEMALE LL

## (undated) DEVICE — GLOVE PROTEXIS HYDROGEL SZ8.5